# Patient Record
(demographics unavailable — no encounter records)

---

## 2025-05-27 NOTE — DISCUSSION/SUMMARY
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  At this time, she was given a cortisone injection to the left shoulder.  I recommend ice, elevation and reassessment in one week for a possible injection on the right.

## 2025-05-27 NOTE — PHYSICAL EXAM
[Normal] : Gait: normal [de-identified] : Right Shoulder: Shoulder: Range of Motion in Degrees:                                         Claimant: Normal:  Abduction (Active)                   180                180 degrees  Abduction (Passive)   180                180 degrees  Forward elevation (Active):   180                180 degrees  Forward elevation (Passive):   180                180 degrees  External rotation (Active):    45                45 degrees  External rotation (Passive):    45                45 degrees  Internal rotation (Active):    L-1                L-1  Internal rotation (Passive):    L-1                L-1    No motor weakness to internal rotation, external rotation or abduction in the scapular plane.  Negative crank test.  Negative [place]'s test.  Negative Speeds test. Negative Yergason's test.  Positive cross arm test.  Positive tenderness to palpation over the AC joint. Positive Hussein sign.  Positive Neer's sign.  Negative apprehension. Negative sulcus sign.  No gross neurological or vascular deficits distally.  Skin is intact.  No rashes, scars or lesions. 2+ radial and ulnar pulses. No extra-articular swelling or tenderness.  Left Shoulder: Shoulder: Range of Motion in Degrees:                                         Claimant: Normal:  Abduction (Active)                   180                180 degrees  Abduction (Passive)   180                180 degrees  Forward elevation (Active):   180                180 degrees  Forward elevation (Passive):   180                180 degrees  External rotation (Active):    45                45 degrees  External rotation (Passive):    45                45 degrees  Internal rotation (Active):    L-1                L-1  Internal rotation (Passive):    L-1                L-1    No motor weakness to internal rotation, external rotation or abduction in the scapular plane.  Negative crank test.  Negative [place]'s test.  Negative Speeds test. Negative Yergason's test.  Positive cross arm test.  Positive tenderness to palpation over the AC joint. Positive Hussein sign.  Positive Neer's sign.  Negative apprehension. Negative sulcus sign.  No gross neurological or vascular deficits distally.  Skin is intact.  No rashes, scars or lesions. 2+ radial and ulnar pulses. No extra-articular swelling or tenderness.   [de-identified] : Station:  Normal. [de-identified] : Appearance:  Well-developed, well-nourished female in no acute distress.   [de-identified] : Radiographs, two views of the bilateral shoulders taken in the office today, show no significant osseous abnormalities.

## 2025-05-27 NOTE — DISCUSSION/SUMMARY
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  At this time, she was given a cortisone injection to the left shoulder.  I recommend ice, elevation and reassessment in one week for a possible injection on the right.

## 2025-05-27 NOTE — HISTORY OF PRESENT ILLNESS
[de-identified] : The patient comes in today, after not being seen in about 1.5 years, with complaints of pain to her bilateral shoulders, left worse than right.

## 2025-05-27 NOTE — HISTORY OF PRESENT ILLNESS
[de-identified] : The patient comes in today, after not being seen in about 1.5 years, with complaints of pain to her bilateral shoulders, left worse than right.

## 2025-05-27 NOTE — PROCEDURE
[de-identified] : Indication: Impingement syndrome and AC joint arthritis left shoulder   Consent: At this time, I have recommended an injection to the left shoulder.  The risks and benefits of the procedure were discussed with the patient in detail.  Upon verbal consent of the patient, we proceeded with the injection as noted below.   Description of Procedure: After a sterile prep, the patient underwent an injection of approximately 9 mL of 1% Lidocaine (10 mg/mL) without epinephrine and 1 mL of triamcinolone acetonide (40 mg/mL) into the left shoulder.  The patient tolerated the procedure well.  There were no complications.   :  [employer] Drug Name:  Triamcinolone Acetonide Injectable Suspension USP NCD#:  [id number] Lot#:  [id number] Expiration Date:  08/31/2025

## 2025-05-27 NOTE — PHYSICAL EXAM
[Normal] : Gait: normal [de-identified] : Right Shoulder: Shoulder: Range of Motion in Degrees:                                         Claimant: Normal:  Abduction (Active)                   180                180 degrees  Abduction (Passive)   180                180 degrees  Forward elevation (Active):   180                180 degrees  Forward elevation (Passive):   180                180 degrees  External rotation (Active):    45                45 degrees  External rotation (Passive):    45                45 degrees  Internal rotation (Active):    L-1                L-1  Internal rotation (Passive):    L-1                L-1    No motor weakness to internal rotation, external rotation or abduction in the scapular plane.  Negative crank test.  Negative [place]'s test.  Negative Speeds test. Negative Yergason's test.  Positive cross arm test.  Positive tenderness to palpation over the AC joint. Positive Hussein sign.  Positive Neer's sign.  Negative apprehension. Negative sulcus sign.  No gross neurological or vascular deficits distally.  Skin is intact.  No rashes, scars or lesions. 2+ radial and ulnar pulses. No extra-articular swelling or tenderness.  Left Shoulder: Shoulder: Range of Motion in Degrees:                                         Claimant: Normal:  Abduction (Active)                   180                180 degrees  Abduction (Passive)   180                180 degrees  Forward elevation (Active):   180                180 degrees  Forward elevation (Passive):   180                180 degrees  External rotation (Active):    45                45 degrees  External rotation (Passive):    45                45 degrees  Internal rotation (Active):    L-1                L-1  Internal rotation (Passive):    L-1                L-1    No motor weakness to internal rotation, external rotation or abduction in the scapular plane.  Negative crank test.  Negative [place]'s test.  Negative Speeds test. Negative Yergason's test.  Positive cross arm test.  Positive tenderness to palpation over the AC joint. Positive Hussein sign.  Positive Neer's sign.  Negative apprehension. Negative sulcus sign.  No gross neurological or vascular deficits distally.  Skin is intact.  No rashes, scars or lesions. 2+ radial and ulnar pulses. No extra-articular swelling or tenderness.   [de-identified] : Station:  Normal. [de-identified] : Appearance:  Well-developed, well-nourished female in no acute distress.   [de-identified] : Radiographs, two views of the bilateral shoulders taken in the office today, show no significant osseous abnormalities.

## 2025-05-27 NOTE — PHYSICAL EXAM
[Normal] : Gait: normal [de-identified] : Right Shoulder: Shoulder: Range of Motion in Degrees:                                         Claimant: Normal:  Abduction (Active)                   180                180 degrees  Abduction (Passive)   180                180 degrees  Forward elevation (Active):   180                180 degrees  Forward elevation (Passive):   180                180 degrees  External rotation (Active):    45                45 degrees  External rotation (Passive):    45                45 degrees  Internal rotation (Active):    L-1                L-1  Internal rotation (Passive):    L-1                L-1    No motor weakness to internal rotation, external rotation or abduction in the scapular plane.  Negative crank test.  Negative [place]'s test.  Negative Speeds test. Negative Yergason's test.  Positive cross arm test.  Positive tenderness to palpation over the AC joint. Positive Hussein sign.  Positive Neer's sign.  Negative apprehension. Negative sulcus sign.  No gross neurological or vascular deficits distally.  Skin is intact.  No rashes, scars or lesions. 2+ radial and ulnar pulses. No extra-articular swelling or tenderness.  Left Shoulder: Shoulder: Range of Motion in Degrees:                                         Claimant: Normal:  Abduction (Active)                   180                180 degrees  Abduction (Passive)   180                180 degrees  Forward elevation (Active):   180                180 degrees  Forward elevation (Passive):   180                180 degrees  External rotation (Active):    45                45 degrees  External rotation (Passive):    45                45 degrees  Internal rotation (Active):    L-1                L-1  Internal rotation (Passive):    L-1                L-1    No motor weakness to internal rotation, external rotation or abduction in the scapular plane.  Negative crank test.  Negative [place]'s test.  Negative Speeds test. Negative Yergason's test.  Positive cross arm test.  Positive tenderness to palpation over the AC joint. Positive Hussein sign.  Positive Neer's sign.  Negative apprehension. Negative sulcus sign.  No gross neurological or vascular deficits distally.  Skin is intact.  No rashes, scars or lesions. 2+ radial and ulnar pulses. No extra-articular swelling or tenderness.   [de-identified] : Station:  Normal. [de-identified] : Appearance:  Well-developed, well-nourished female in no acute distress.   [de-identified] : Radiographs, two views of the bilateral shoulders taken in the office today, show no significant osseous abnormalities.

## 2025-05-27 NOTE — HISTORY OF PRESENT ILLNESS
[de-identified] : The patient comes in today, after not being seen in about 1.5 years, with complaints of pain to her bilateral shoulders, left worse than right.

## 2025-05-27 NOTE — DISCUSSION/SUMMARY
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  At this time, she was given a cortisone injection to the left shoulder.  I recommend ice, elevation and reassessment in one week for a possible injection on the right.

## 2025-05-27 NOTE — PROCEDURE
[de-identified] : Indication: Impingement syndrome and AC joint arthritis left shoulder   Consent: At this time, I have recommended an injection to the left shoulder.  The risks and benefits of the procedure were discussed with the patient in detail.  Upon verbal consent of the patient, we proceeded with the injection as noted below.   Description of Procedure: After a sterile prep, the patient underwent an injection of approximately 9 mL of 1% Lidocaine (10 mg/mL) without epinephrine and 1 mL of triamcinolone acetonide (40 mg/mL) into the left shoulder.  The patient tolerated the procedure well.  There were no complications.   :  [employer] Drug Name:  Triamcinolone Acetonide Injectable Suspension USP NCD#:  [id number] Lot#:  [id number] Expiration Date:  08/31/2025

## 2025-05-27 NOTE — PROCEDURE
[de-identified] : Indication: Impingement syndrome and AC joint arthritis left shoulder   Consent: At this time, I have recommended an injection to the left shoulder.  The risks and benefits of the procedure were discussed with the patient in detail.  Upon verbal consent of the patient, we proceeded with the injection as noted below.   Description of Procedure: After a sterile prep, the patient underwent an injection of approximately 9 mL of 1% Lidocaine (10 mg/mL) without epinephrine and 1 mL of triamcinolone acetonide (40 mg/mL) into the left shoulder.  The patient tolerated the procedure well.  There were no complications.   :  [employer] Drug Name:  Triamcinolone Acetonide Injectable Suspension USP NCD#:  [address]-4 Lot#:  [id number] Expiration Date:  08/31/2025

## 2025-05-27 NOTE — ADDENDUM
[FreeTextEntry1] : This note was written by Debbie Garcia on 05/22/2025 acting as scribe for Marshall Woods III, MD

## 2025-05-27 NOTE — REASON FOR VISIT
[Follow-Up Visit] : a follow-up visit for [FreeTextEntry2] : her left shoulder and new concern for her right shoulder.

## 2025-05-27 NOTE — DISCUSSION/SUMMARY
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  At this time, she was given a cortisone injection to the left shoulder.  I recommend ice, elevation and reassessment in one week for a possible injection on the right.

## 2025-05-27 NOTE — PROCEDURE
[de-identified] : Indication: Impingement syndrome and AC joint arthritis left shoulder   Consent: At this time, I have recommended an injection to the left shoulder.  The risks and benefits of the procedure were discussed with the patient in detail.  Upon verbal consent of the patient, we proceeded with the injection as noted below.   Description of Procedure: After a sterile prep, the patient underwent an injection of approximately 9 mL of 1% Lidocaine (10 mg/mL) without epinephrine and 1 mL of triamcinolone acetonide (40 mg/mL) into the left shoulder.  The patient tolerated the procedure well.  There were no complications.   :  [employer] Drug Name:  Triamcinolone Acetonide Injectable Suspension USP NCD#:  [id number] Lot#:  [id number] Expiration Date:  08/31/2025

## 2025-05-27 NOTE — PHYSICAL EXAM
[Normal] : Gait: normal [de-identified] : Right Shoulder: Shoulder: Range of Motion in Degrees:                                         Claimant: Normal:  Abduction (Active)                   180                180 degrees  Abduction (Passive)   180                180 degrees  Forward elevation (Active):   180                180 degrees  Forward elevation (Passive):   180                180 degrees  External rotation (Active):    45                45 degrees  External rotation (Passive):    45                45 degrees  Internal rotation (Active):    L-1                L-1  Internal rotation (Passive):    L-1                L-1    No motor weakness to internal rotation, external rotation or abduction in the scapular plane.  Negative crank test.  Negative [place]'s test.  Negative Speeds test. Negative Yergason's test.  Positive cross arm test.  Positive tenderness to palpation over the AC joint. Positive Hussein sign.  Positive Neer's sign.  Negative apprehension. Negative sulcus sign.  No gross neurological or vascular deficits distally.  Skin is intact.  No rashes, scars or lesions. 2+ radial and ulnar pulses. No extra-articular swelling or tenderness.  Left Shoulder: Shoulder: Range of Motion in Degrees:                                         Claimant: Normal:  Abduction (Active)                   180                180 degrees  Abduction (Passive)   180                180 degrees  Forward elevation (Active):   180                180 degrees  Forward elevation (Passive):   180                180 degrees  External rotation (Active):    45                45 degrees  External rotation (Passive):    45                45 degrees  Internal rotation (Active):    L-1                L-1  Internal rotation (Passive):    L-1                L-1    No motor weakness to internal rotation, external rotation or abduction in the scapular plane.  Negative crank test.  Negative [place]'s test.  Negative Speeds test. Negative Yergason's test.  Positive cross arm test.  Positive tenderness to palpation over the AC joint. Positive Hussein sign.  Positive Neer's sign.  Negative apprehension. Negative sulcus sign.  No gross neurological or vascular deficits distally.  Skin is intact.  No rashes, scars or lesions. 2+ radial and ulnar pulses. No extra-articular swelling or tenderness.   [de-identified] : Station:  Normal. [de-identified] : Appearance:  Well-developed, well-nourished female in no acute distress.   [de-identified] : Radiographs, two views of the bilateral shoulders taken in the office today, show no significant osseous abnormalities.

## 2025-05-27 NOTE — HISTORY OF PRESENT ILLNESS
[de-identified] : The patient comes in today, after not being seen in about 1.5 years, with complaints of pain to her bilateral shoulders, left worse than right.